# Patient Record
Sex: FEMALE | Race: OTHER | HISPANIC OR LATINO
[De-identification: names, ages, dates, MRNs, and addresses within clinical notes are randomized per-mention and may not be internally consistent; named-entity substitution may affect disease eponyms.]

---

## 2021-09-30 ENCOUNTER — APPOINTMENT (OUTPATIENT)
Dept: OTOLARYNGOLOGY | Facility: CLINIC | Age: 51
End: 2021-09-30
Payer: COMMERCIAL

## 2021-09-30 VITALS
WEIGHT: 140 LBS | DIASTOLIC BLOOD PRESSURE: 82 MMHG | HEIGHT: 62 IN | SYSTOLIC BLOOD PRESSURE: 134 MMHG | HEART RATE: 84 BPM | BODY MASS INDEX: 25.76 KG/M2

## 2021-09-30 DIAGNOSIS — H92.01 OTALGIA, RIGHT EAR: ICD-10-CM

## 2021-09-30 DIAGNOSIS — H92.02 OTALGIA, LEFT EAR: ICD-10-CM

## 2021-09-30 DIAGNOSIS — Z78.9 OTHER SPECIFIED HEALTH STATUS: ICD-10-CM

## 2021-09-30 PROCEDURE — 69210 REMOVE IMPACTED EAR WAX UNI: CPT | Mod: LT

## 2021-09-30 PROCEDURE — 99203 OFFICE O/P NEW LOW 30 MIN: CPT | Mod: 25

## 2021-09-30 NOTE — REASON FOR VISIT
[Initial Consultation] : an initial consultation for [FreeTextEntry2] : referred by Dr Leslie for left ear otalgia

## 2021-09-30 NOTE — PROCEDURE
[Cerumen Impaction] : Cerumen Impaction [Other ___] : [unfilled] [] : Removal of Cerumen [FreeTextEntry6] : Cerumen and fungus ball was removed under binocular microscopy from left ear with a combination of a suction and/or a loop curette.  The ear was then filled with clotrimazole cream.  The patient tolerated the procedure well and there were no complications. The  findings are noted above.\par \par

## 2021-09-30 NOTE — PHYSICAL EXAM
[de-identified] : Left fungal otitis externa with fungus ball in the ear-to be debris removed and clotrimazole cream instilled [Normal] : mucosa is normal

## 2021-09-30 NOTE — HISTORY OF PRESENT ILLNESS
[de-identified] : 50 year old female referred by Dr Leslie for left ear otalgia. States  left ear itchiness, fullness, discomfort with tinnitus, and has clear to yellow otorrhea.  Patient denies ear infections, hearing loss, dizziness, vertigo, or  headaches related to hearing. Uses debrox at times. \par

## 2021-10-14 ENCOUNTER — APPOINTMENT (OUTPATIENT)
Dept: OTOLARYNGOLOGY | Facility: CLINIC | Age: 51
End: 2021-10-14
Payer: COMMERCIAL

## 2021-10-14 VITALS
HEIGHT: 62 IN | SYSTOLIC BLOOD PRESSURE: 99 MMHG | BODY MASS INDEX: 25.76 KG/M2 | WEIGHT: 140 LBS | HEART RATE: 74 BPM | DIASTOLIC BLOOD PRESSURE: 66 MMHG

## 2021-10-14 VITALS — WEIGHT: 140 LBS | HEIGHT: 62 IN | BODY MASS INDEX: 25.76 KG/M2

## 2021-10-14 DIAGNOSIS — H62.42 SUPERFICIAL MYCOSIS, UNSPECIFIED: ICD-10-CM

## 2021-10-14 DIAGNOSIS — B36.9 SUPERFICIAL MYCOSIS, UNSPECIFIED: ICD-10-CM

## 2021-10-14 DIAGNOSIS — H93.12 TINNITUS, LEFT EAR: ICD-10-CM

## 2021-10-14 PROCEDURE — 92557 COMPREHENSIVE HEARING TEST: CPT

## 2021-10-14 PROCEDURE — 92567 TYMPANOMETRY: CPT

## 2021-10-14 PROCEDURE — 99214 OFFICE O/P EST MOD 30 MIN: CPT | Mod: 25

## 2021-10-14 RX ORDER — ASPIRIN 81 MG
TABLET, DELAYED RELEASE (ENTERIC COATED) ORAL
Refills: 0 | Status: COMPLETED | COMMUNITY
End: 2021-10-14

## 2021-10-15 PROBLEM — B36.9 OTITIS EXTERNA, FUNGAL, LEFT EAR: Status: ACTIVE | Noted: 2021-09-30

## 2021-10-15 NOTE — DATA REVIEWED
[de-identified] : Hearing WNL, AD\par Hearing WNL to 3kHz sloping to a mild CHL at 4kHz rising to a slight loss at 8kHz, AS\par Type A tymp, AU

## 2021-10-15 NOTE — HISTORY OF PRESENT ILLNESS
[de-identified] : 50 year old female presents for a follow up for left ear fungus infection..  States left ear feels better but feels clogged, tinnitus has improved but is still there.  Patient denies otalgia, otorrhea, ear infections, hearing loss, tinnitus, dizziness, vertigo, or headaches related to hearing.\par

## 2021-10-15 NOTE — PHYSICAL EXAM
[Normal] : tympanic membranes are normal in both ears [de-identified] : Left ear had residual ointment but otherwise no fungus seen

## 2021-11-16 ENCOUNTER — APPOINTMENT (OUTPATIENT)
Dept: OTOLARYNGOLOGY | Facility: CLINIC | Age: 51
End: 2021-11-16